# Patient Record
Sex: MALE | Race: WHITE | NOT HISPANIC OR LATINO | Employment: FULL TIME | URBAN - METROPOLITAN AREA
[De-identification: names, ages, dates, MRNs, and addresses within clinical notes are randomized per-mention and may not be internally consistent; named-entity substitution may affect disease eponyms.]

---

## 2024-11-25 ENCOUNTER — OFFICE VISIT (OUTPATIENT)
Dept: URGENT CARE | Facility: CLINIC | Age: 44
End: 2024-11-25
Payer: COMMERCIAL

## 2024-11-25 ENCOUNTER — HOSPITAL ENCOUNTER (OUTPATIENT)
Dept: RADIOLOGY | Facility: CLINIC | Age: 44
Discharge: HOME | End: 2024-11-25
Payer: COMMERCIAL

## 2024-11-25 VITALS
HEIGHT: 69 IN | BODY MASS INDEX: 22.22 KG/M2 | OXYGEN SATURATION: 97 % | TEMPERATURE: 97.8 F | RESPIRATION RATE: 16 BRPM | SYSTOLIC BLOOD PRESSURE: 120 MMHG | HEART RATE: 86 BPM | DIASTOLIC BLOOD PRESSURE: 86 MMHG | WEIGHT: 150 LBS

## 2024-11-25 DIAGNOSIS — R50.9 FEVER, UNSPECIFIED FEVER CAUSE: Primary | ICD-10-CM

## 2024-11-25 DIAGNOSIS — R50.9 FEVER, UNSPECIFIED FEVER CAUSE: ICD-10-CM

## 2024-11-25 DIAGNOSIS — J20.9 ACUTE BRONCHITIS, UNSPECIFIED ORGANISM: ICD-10-CM

## 2024-11-25 LAB
POC CORONAVIRUS 2019 BY PCR (COV19): NOT DETECTED
POC FLU A RESULT: NOT DETECTED
POC FLU B RESULT: NOT DETECTED
POC RSV PCR: NOT DETECTED

## 2024-11-25 PROCEDURE — 71046 X-RAY EXAM CHEST 2 VIEWS: CPT

## 2024-11-25 PROCEDURE — 71046 X-RAY EXAM CHEST 2 VIEWS: CPT | Performed by: RADIOLOGY

## 2024-11-25 PROCEDURE — 99213 OFFICE O/P EST LOW 20 MIN: CPT | Mod: 25 | Performed by: PHYSICIAN ASSISTANT

## 2024-11-25 RX ORDER — DOXYCYCLINE 100 MG/1
100 CAPSULE ORAL 2 TIMES DAILY
Qty: 20 CAPSULE | Refills: 0 | Status: SHIPPED | OUTPATIENT
Start: 2024-11-25 | End: 2024-12-05

## 2024-11-25 RX ORDER — GUAIFENESIN 600 MG/1
600 TABLET, EXTENDED RELEASE ORAL 2 TIMES DAILY
Qty: 28 TABLET | Refills: 0 | Status: SHIPPED | OUTPATIENT
Start: 2024-11-25 | End: 2024-12-09

## 2024-11-25 ASSESSMENT — ENCOUNTER SYMPTOMS
ACTIVITY CHANGE: 1
SHORTNESS OF BREATH: 0
ABDOMINAL PAIN: 0
SINUS PRESSURE: 1
WHEEZING: 0
SINUS COMPLAINT: 1
COUGH: 1
FATIGUE: 1
FACIAL SWELLING: 0
CHILLS: 1
APPETITE CHANGE: 1
SINUS PAIN: 1
FEVER: 1

## 2024-11-25 NOTE — PROGRESS NOTES
Subjective   Patient ID: Kurtis Espinoza is a 44 y.o. male.    Patient presents today for evaluation of upper respiratory symptoms that have been ongoing for the past 3 days.  Patient states on Friday he started having bodyaches and chills.  He noticed on Saturday Sunday and today that he has been having fever with a temperature of 101 102.  Patient is a smoker.  Overall he just does not feel well.  He is having significant chills body/ aches.        Sinus Problem  Associated symptoms: cough, fatigue and fever    Associated symptoms: no abdominal pain, no chest pain, no congestion, no ear pain, no rash, no shortness of breath and no wheezing        The following portions of the chart were reviewed this encounter and updated as appropriate:  Allergies           Review of Systems   Constitutional:  Positive for activity change, appetite change, chills, fatigue and fever.   HENT:  Positive for sinus pressure and sinus pain. Negative for congestion, ear pain and facial swelling.    Respiratory:  Positive for cough. Negative for shortness of breath and wheezing.    Cardiovascular:  Negative for chest pain.   Gastrointestinal:  Negative for abdominal pain.   Skin:  Negative for rash.     Objective   Physical Exam  Constitutional:       Appearance: Normal appearance.   HENT:      Head: Normocephalic and atraumatic.      Right Ear: Tympanic membrane normal.      Left Ear: Tympanic membrane normal.      Nose: Nose normal.      Mouth/Throat:      Mouth: Mucous membranes are dry.   Cardiovascular:      Rate and Rhythm: Normal rate and regular rhythm.   Pulmonary:      Effort: Pulmonary effort is normal.      Breath sounds: Normal breath sounds.   Abdominal:      Palpations: Abdomen is soft.   Skin:     General: Skin is warm and dry.   Neurological:      Mental Status: He is alert.       Procedures    Assessment/Plan   Diagnoses and all orders for this visit:  Fever, unspecified fever cause  -     XR chest 2 views;  Future  -     POCT SARS-COV-2/FLU/RSV PCR SYMPTOMATIC manually resulted  Acute bronchitis, unspecified organism  -     doxycycline (Monodox) 100 mg capsule; Take 1 capsule (100 mg) by mouth 2 times a day for 10 days. Take with at least 8 ounces (large glass) of water, do not lie down for 30 minutes after  -     guaiFENesin (Mucinex) 600 mg 12 hr tablet; Take 1 tablet (600 mg) by mouth 2 times a day for 14 days. Do not crush, chew, or split.      Patient was negative for COVID flu and RSV.  Chest x-ray was negative.  Patient will be empirically treated and covered for acute bronchitis given his smoking history and productive cough.  Patient will also be given Mucinex.  Patient's past medical history and vital signs were reviewed.  Patient understood plan of care and work excuse was provided.  Patient will return to clinic if symptoms do not improve or follow-up with primary care physician.      Patient disposition: Home

## 2025-06-24 ENCOUNTER — OFFICE VISIT (OUTPATIENT)
Dept: URGENT CARE | Facility: CLINIC | Age: 45
End: 2025-06-24
Payer: COMMERCIAL

## 2025-06-24 ENCOUNTER — HOSPITAL ENCOUNTER (OUTPATIENT)
Dept: RADIOLOGY | Facility: CLINIC | Age: 45
Discharge: HOME | End: 2025-06-24
Payer: COMMERCIAL

## 2025-06-24 VITALS
TEMPERATURE: 98.9 F | SYSTOLIC BLOOD PRESSURE: 107 MMHG | OXYGEN SATURATION: 99 % | RESPIRATION RATE: 14 BRPM | DIASTOLIC BLOOD PRESSURE: 65 MMHG | HEART RATE: 60 BPM

## 2025-06-24 DIAGNOSIS — M54.50 LUMBAR BACK PAIN: ICD-10-CM

## 2025-06-24 DIAGNOSIS — M54.50 LUMBAR BACK PAIN: Primary | ICD-10-CM

## 2025-06-24 PROCEDURE — 72100 X-RAY EXAM L-S SPINE 2/3 VWS: CPT | Performed by: SURGERY

## 2025-06-24 PROCEDURE — 96372 THER/PROPH/DIAG INJ SC/IM: CPT | Performed by: NURSE PRACTITIONER

## 2025-06-24 PROCEDURE — 2500000004 HC RX 250 GENERAL PHARMACY W/ HCPCS (ALT 636 FOR OP/ED): Mod: JZ | Performed by: NURSE PRACTITIONER

## 2025-06-24 PROCEDURE — 99213 OFFICE O/P EST LOW 20 MIN: CPT | Mod: 25 | Performed by: NURSE PRACTITIONER

## 2025-06-24 PROCEDURE — 72100 X-RAY EXAM L-S SPINE 2/3 VWS: CPT

## 2025-06-24 RX ORDER — PREDNISONE 10 MG/1
30 TABLET ORAL DAILY
Qty: 21 TABLET | Refills: 0 | Status: SHIPPED | OUTPATIENT
Start: 2025-06-24 | End: 2025-07-01

## 2025-06-24 RX ORDER — METHYLPREDNISOLONE SODIUM SUCCINATE 125 MG/2ML
62.5 INJECTION INTRAMUSCULAR; INTRAVENOUS ONCE
Status: COMPLETED | OUTPATIENT
Start: 2025-06-24 | End: 2025-06-24

## 2025-06-24 RX ORDER — CYCLOBENZAPRINE HCL 10 MG
10 TABLET ORAL EVERY 8 HOURS PRN
Qty: 30 TABLET | Refills: 0 | Status: SHIPPED | OUTPATIENT
Start: 2025-06-24 | End: 2025-07-04

## 2025-06-24 RX ORDER — METHYLPREDNISOLONE ACETATE 80 MG/ML
40 INJECTION, SUSPENSION INTRA-ARTICULAR; INTRALESIONAL; INTRAMUSCULAR; SOFT TISSUE ONCE
Status: COMPLETED | OUTPATIENT
Start: 2025-06-24 | End: 2025-06-24

## 2025-06-24 RX ORDER — DIVALPROEX SODIUM 250 MG/1
250 TABLET, DELAYED RELEASE ORAL 2 TIMES DAILY
COMMUNITY
Start: 2025-04-16

## 2025-06-24 RX ORDER — ESCITALOPRAM OXALATE 10 MG/1
10 TABLET ORAL DAILY
COMMUNITY

## 2025-06-24 RX ORDER — NAPROXEN 500 MG/1
500 TABLET ORAL
Qty: 20 TABLET | Refills: 0 | Status: SHIPPED | OUTPATIENT
Start: 2025-06-24 | End: 2025-07-04

## 2025-06-24 RX ADMIN — METHYLPREDNISOLONE ACETATE 40 MG: 80 INJECTION, SUSPENSION INTRA-ARTICULAR; INTRALESIONAL; INTRAMUSCULAR; SOFT TISSUE at 18:29

## 2025-06-24 RX ADMIN — METHYLPREDNISOLONE SODIUM SUCCINATE 62.5 MG: 125 INJECTION, POWDER, FOR SOLUTION INTRAMUSCULAR; INTRAVENOUS at 18:30

## 2025-06-24 NOTE — LETTER
June 24, 2025     Patient: Kurtis Espinoza   YOB: 1980   Date of Visit: 6/24/2025       To Whom It May Concern:    It is my medical opinion that Kurtis Espinoza may return to work on 6/26/2025.    If you have any questions or concerns, please don't hesitate to call.         Sincerely,        ELVIS Hernandez-CNP    CC: No Recipients

## 2025-06-24 NOTE — PROGRESS NOTES
[facility]   VAL NOTE:      Name: Kurtis Espinoza, 45 y.o.    CSN:[id number]   MRN:[id number]      ALL:  Allergies[1]      Chief Complaint: Back Pain (Lower back pain after lifting heavy object x 3 days )    Encounter Date: 6/24/2025      HPI: The history was obtained from the patient. Kurtis is a 45 y.o. male, who presents with a chief complaint of lower back pain that began approximately 3 days ago. The pain is described as sharp, located in the lumbar region. The pain does not radiates, pain is worsened by standing and bending. The patient denies any recent trauma or injury but reports heavy lifting and repetitive motion at work as a potential contributing factor. Denies numbness, tingling, weakness. Denies bowel or bladder dysfunction. There is history of similar back pain in the past.  Has been utilizing Tylenol and ibuprofen with only minimal improvement of symptoms.  Denies any fevers, chills, weight loss, night sweats.  Denies history of malignancy, IV drug use or recent infection.           PMHx:    Medical History[2]        Current Medications[3]      PMSx:  Surgical History[4]    Fam Hx: Family History[5]    SOC. Hx:     Social History     Socioeconomic History    Marital status: Single     Spouse name: Not on file    Number of children: Not on file    Years of education: Not on file    Highest education level: Not on file   Occupational History    Not on file   Tobacco Use    Smoking status: Not on file    Smokeless tobacco: Not on file   Substance and Sexual Activity    Alcohol use: Not on file    Drug use: Not on file    Sexual activity: Not on file   Other Topics Concern    Not on file   Social History Narrative    Not on file     Social Drivers of Health     Financial Resource Strain: Not on file   Food Insecurity: No Food Insecurity (3/30/2025)    Received from [facility] Vital Sign     Worried About Running Out of Food in the Last Year: Never  true     Ran Out of Food in the Last Year: Never true   Transportation Needs: No Transportation Needs (3/30/2025)    Received from [facility]    PRAPARE - Transportation     Lack of Transportation (Medical): No     Lack of Transportation (Non-Medical): No   Physical Activity: Not on file   Stress: Not on file   Social Connections: Not on file   Intimate Partner Violence: Not At Risk (3/30/2025)    Received from [facility]    Humiliation, Afraid, Rape, and Kick questionnaire     Fear of Current or Ex-Partner: No     Emotionally Abused: No     Physically Abused: No     Sexually Abused: No   Housing Stability: Low Risk  (3/30/2025)    Received from [facility]    Housing Stability Vital Sign     Unable to Pay for Housing in the Last Year: No     Number of Times Moved in the Last Year: 1     Homeless in the Last Year: No         Vitals:    06/24/25 1817   BP: 107/65   Pulse: 60   Resp: 14   Temp: 37.2 °C (98.9 °F)   SpO2: 99%                Physical Exam  Vitals and nursing note reviewed.   Constitutional:       General: He is not in acute distress.     Appearance: Normal appearance. He is not ill-appearing.   HENT:      Head: Normocephalic and atraumatic.      Mouth/Throat:      Mouth: Mucous membranes are moist.   Cardiovascular:      Rate and Rhythm: Normal rate and regular rhythm.      Heart sounds: Normal heart sounds.   Pulmonary:      Effort: Pulmonary effort is normal.      Breath sounds: Normal breath sounds.   Abdominal:      General: Bowel sounds are normal.   Musculoskeletal:      Lumbar back: Spasms and tenderness present. No swelling, edema, deformity, signs of trauma, lacerations or bony tenderness. Decreased range of motion. Negative right straight leg raise test and negative left straight leg raise test. No scoliosis.   Skin:     General: Skin is warm and dry.      Capillary Refill: Capillary refill takes less than 2 seconds.   Neurological:      Mental Status: He is alert and oriented to person, place,  and time.           LABORATORY @ RADIOLOGICAL IMAGING (if done):    Awaiting xray results  ____________________________________________________________________    I did personally review Kurtis's past medical history, surgical history, social history, as well as family history (when relevant).  In this case, I also oversaw the his drug management by reviewing his medication list, allergy list, as well as the medications that I prescribed during the UC course and/or recommended as an out-patient (including possible OTC medications such as acetaminophen, NSAIDs , etc).    After reviewing the items above, I did look at previous medical documentation, such as recent hospitalizations, office visits, and/or recent consultations with PCP/specialist.                          SDOH:   Another factor that I considered in Kurtis's care was his Social Determinants of Health (SDOH). During this UC encounter, he did not have social determinants of health. Those SDOH influencing Kurtis's care are: none      _____________________________________________________________________      UC COURSE/MEDICAL DECISION MAKING:    Kurtis is a 45 y.o., who presents with a working diagnosis of   1. Lumbar back pain     with a differential to include: Sprain, strain, contusion, fracture, avulsion fracture, dislocation, tendinitis, tenosynovitis     Plan:   Lumbar spinal x-ray to rule out fracture  Solu-Medrol/Depo-Medrol injection in clinic today then will start p.o. prednisone tomorrow  Flexeril 10 mg as needed for muscle pain and spasms.  Patient educated to use caution when driving, running machinery, making important decisions due to sedate of effects of medication  Naproxen twice daily for 10 days, advised to take with food  Return to urgent care, primary care provider, or emergency department with worsening symptoms      No red flags on exam. Plan of care reviewed with patient, agreeable to discharge      OLEG Quick DNP    Advanced Practice Provider  [facility]    Please note: While the patient may or may not have received printed discharge paperwork, all relevant medical findings, test results, and treatment details are accessible through the electronic medical record system. The patient is encouraged to review their chart via the patient portal for comprehensive information and follow-up instructions.       [1]   Allergies  Allergen Reactions    Cephalexin Hives   [2] No past medical history on file.  [3]   Current Outpatient Medications   Medication Sig Dispense Refill    divalproex (Depakote) 250 mg EC tablet Take 1 tablet (250 mg) by mouth twice a day.      escitalopram (Lexapro) 10 mg tablet Take 1 tablet (10 mg) by mouth once daily.      cyclobenzaprine (Flexeril) 10 mg tablet Take 1 tablet (10 mg) by mouth every 8 hours if needed for muscle spasms for up to 10 days. 30 tablet 0    naproxen (Naprosyn) 500 mg tablet Take 1 tablet (500 mg) by mouth 2 times daily (morning and late afternoon) for 10 days. 20 tablet 0    predniSONE (Deltasone) 10 mg tablet Take 3 tablets (30 mg) by mouth once daily for 7 days. 21 tablet 0     Current Facility-Administered Medications   Medication Dose Route Frequency Provider Last Rate Last Admin    methylPREDNISolone acetate (DEPO-Medrol) injection 40 mg  40 mg intramuscular Once JENNIFER Hernandez        methylPREDNISolone sodium succinate (PF) (SOLU-Medrol) injection 62.5 mg  62.5 mg intramuscular Once ELVIS Hernandez-CNP       [4] No past surgical history on file.  [5] No family history on file.

## 2025-06-25 ENCOUNTER — TELEPHONE (OUTPATIENT)
Dept: URGENT CARE | Facility: CLINIC | Age: 45
End: 2025-06-25
Payer: COMMERCIAL

## 2025-06-25 DIAGNOSIS — M48.061 NARROWING OF LUMBAR SPINE: Primary | ICD-10-CM

## 2025-06-25 NOTE — TELEPHONE ENCOUNTER
Result Communication    Resulted Orders   XR lumbar spine 2-3 views    Narrative    Interpreted By:  Ld Us,   STUDY:  XR LUMBAR SPINE 2-3 VIEWS; ;  6/24/2025 6:29 pm      INDICATION:  Signs/Symptoms:pain.      ,M54.50 Low back pain, unspecified      COMPARISON:  None.      ACCESSION NUMBER(S):  [id number]      ORDERING CLINICIAN:  CHAITANYA SCHULTZ      FINDINGS:  Three views of the lumbar spine.      For the purposes of this report, the last well-formed disc space is  regarded as L5-S1.      No acute fracture or osseous lesion is seen. Trace degenerative  retrolisthesis at L5-S1. Vertebral stature and alignment are  otherwise maintained. Mild degenerative disc height loss in  conjunction with endplate osteophytosis at L4-L5. Severe hypertrophic  facet osteoarthropathy at L5-S1 and moderate to severe hypertrophic  facet osteoarthropathy at L4-L5. Severe foraminal narrowing at L5-S1  with at least moderate foraminal narrowing at L4-L5 on lateral  radiography, the corresponding laterality of which is difficult to  determine. Mild degenerative changes of the sacroiliac joints.      Paraspinal soft tissues are grossly unremarkable. Extra-spinal  osseous structures are intact. Lung bases are grossly clear.        Impression    No evidence of acute osseous abnormality.      Mild-to-moderate spondylotic changes, worst at L5-S1, where severe  hypertrophic facet osteoarthropathy results in severe bony foraminal  narrowing, the laterality which is difficult to determine. Consider  further evaluation with nonemergent CT.      MACRO:  None      Signed by: Ld Us 6/24/2025 6:34 PM  Dictation workstation:   [id number]       2:12 PM      Results were successfully communicated with the patient and they acknowledged their understanding. MRI ordered

## 2025-07-25 ENCOUNTER — APPOINTMENT (OUTPATIENT)
Dept: RADIOLOGY | Facility: HOSPITAL | Age: 45
End: 2025-07-25
Payer: COMMERCIAL